# Patient Record
Sex: FEMALE | Race: WHITE | NOT HISPANIC OR LATINO | URBAN - METROPOLITAN AREA
[De-identification: names, ages, dates, MRNs, and addresses within clinical notes are randomized per-mention and may not be internally consistent; named-entity substitution may affect disease eponyms.]

---

## 2024-01-13 ENCOUNTER — CLAIMS CREATED FROM THE CLAIM WINDOW (OUTPATIENT)
Dept: URBAN - METROPOLITAN AREA MEDICAL CENTER 25 | Facility: MEDICAL CENTER | Age: 49
End: 2024-01-13
Payer: COMMERCIAL

## 2024-01-13 DIAGNOSIS — K85.80 ACUTE TRAUMATIC PANCREATITIS: ICD-10-CM

## 2024-01-13 DIAGNOSIS — R74.8 ABNORMAL ALKALINE PHOSPHATASE TEST: ICD-10-CM

## 2024-01-13 DIAGNOSIS — R93.3 ABN FINDINGS-GI TRACT: ICD-10-CM

## 2024-01-13 PROCEDURE — 99254 IP/OBS CNSLTJ NEW/EST MOD 60: CPT | Performed by: INTERNAL MEDICINE

## 2024-01-13 PROCEDURE — 99222 1ST HOSP IP/OBS MODERATE 55: CPT | Performed by: INTERNAL MEDICINE

## 2024-01-13 PROCEDURE — G8427 DOCREV CUR MEDS BY ELIG CLIN: HCPCS | Performed by: INTERNAL MEDICINE

## 2024-01-14 ENCOUNTER — CLAIMS CREATED FROM THE CLAIM WINDOW (OUTPATIENT)
Dept: URBAN - METROPOLITAN AREA MEDICAL CENTER 25 | Facility: MEDICAL CENTER | Age: 49
End: 2024-01-14
Payer: COMMERCIAL

## 2024-01-14 DIAGNOSIS — K85.80 ACUTE TRAUMATIC PANCREATITIS: ICD-10-CM

## 2024-01-14 DIAGNOSIS — R74.8 ABNORMAL ALKALINE PHOSPHATASE TEST: ICD-10-CM

## 2024-01-14 PROCEDURE — 99232 SBSQ HOSP IP/OBS MODERATE 35: CPT | Performed by: INTERNAL MEDICINE

## 2024-02-16 ENCOUNTER — LAB (OUTPATIENT)
Dept: URBAN - METROPOLITAN AREA CLINIC 19 | Facility: CLINIC | Age: 49
End: 2024-02-16

## 2024-02-16 ENCOUNTER — OV HOSPF/U (OUTPATIENT)
Dept: URBAN - METROPOLITAN AREA CLINIC 19 | Facility: CLINIC | Age: 49
End: 2024-02-16
Payer: COMMERCIAL

## 2024-02-16 VITALS
BODY MASS INDEX: 28.77 KG/M2 | HEART RATE: 72 BPM | DIASTOLIC BLOOD PRESSURE: 72 MMHG | HEIGHT: 66 IN | SYSTOLIC BLOOD PRESSURE: 128 MMHG | WEIGHT: 179 LBS | TEMPERATURE: 98.4 F

## 2024-02-16 DIAGNOSIS — Z12.11 SCREEN FOR COLON CANCER: ICD-10-CM

## 2024-02-16 DIAGNOSIS — K85.90 ACUTE PANCREATITIS: ICD-10-CM

## 2024-02-16 PROCEDURE — 99214 OFFICE O/P EST MOD 30 MIN: CPT | Performed by: NURSE PRACTITIONER

## 2024-02-16 RX ORDER — TRAMADOL HYDROCHLORIDE 50 MG/1
1 TABLET AS NEEDED TABLET, FILM COATED ORAL
Status: ACTIVE | COMMUNITY

## 2024-02-16 NOTE — HPI-TODAY'S VISIT:
48-year-old female presents today for hospital follow-up.  She presented to [facility] on 1/12/2024 - 1/14/2024 with epigastric abdominal pain and nausea.  Has a history of cholecystitis s/p cholecystectomy 11/2023.  Found to have acute pancreatitis. She was taking Ozempic.  Started last year, stopped 10/2023 and resumed a week prior to admission.  Reported occasional alcohol use.  Did go out the night before and had 3 drinks. Lipase greater than 1200 T. bili 4.2, , ,  CT A/P showed acute pancreatitis MRCP:Fluid and inflammatory changes surrounding the enlarged pancreas, compatible with ongoing acute pancreatitis.  Patient is status postcholecystectomy and there is prominent caliber of common bile duct without discrete filling defects to confirm choledocholithiasis.  Prominent caliber may simply be related to postcholecystectomy state. GI consulted Recommend repeat MRI pancreas to rule out underlying pancreatic parenchymal pathology.   She feels well, no pain or N/V. Wants to go back on Ozempic. Bowels regular daily. No bloody or black stools. She has never had a colonoscopy.   Father had colon polyps. No family hx of colon cancer.

## 2024-02-17 LAB
A/G RATIO: 1.6
ALBUMIN: 4.4
ALKALINE PHOSPHATASE: 71
ALT (SGPT): 20
AST (SGOT): 16
BILIRUBIN, TOTAL: 0.5
BUN/CREATININE RATIO: (no result)
BUN: 13
CALCIUM: 9.5
CARBON DIOXIDE, TOTAL: 24
CHLORIDE: 103
CREATININE: 0.65
EGFR: 110
GLOBULIN, TOTAL: 2.7
GLUCOSE: 74
HEMATOCRIT: 41.2
HEMOGLOBIN: 14.2
LIPASE: 31
MCH: 31.2
MCHC: 34.5
MCV: 90.5
MPV: 10.1
PLATELET COUNT: 409
POTASSIUM: 4.3
PROTEIN, TOTAL: 7.1
RDW: 12.3
RED BLOOD CELL COUNT: 4.55
SODIUM: 142
WHITE BLOOD CELL COUNT: 7.1